# Patient Record
Sex: MALE | Race: WHITE | HISPANIC OR LATINO | URBAN - METROPOLITAN AREA
[De-identification: names, ages, dates, MRNs, and addresses within clinical notes are randomized per-mention and may not be internally consistent; named-entity substitution may affect disease eponyms.]

---

## 2021-07-15 ENCOUNTER — OFFICE VISIT (OUTPATIENT)
Dept: URBAN - METROPOLITAN AREA CLINIC 27 | Facility: CLINIC | Age: 56
End: 2021-07-15

## 2021-07-15 PROBLEM — 313436004 TYPE II DIABETES MELLITUS WITHOUT COMPLICATION: Status: ACTIVE | Noted: 2021-07-15

## 2021-07-15 PROBLEM — 266435005 GASTRO-ESOPHAGEAL REFLUX DISEASE WITHOUT ESOPHAGITIS: Status: ACTIVE | Noted: 2021-07-15

## 2021-07-15 PROBLEM — 275978004 SCREENING FOR MALIGNANT NEOPLASM OF COLON: Status: ACTIVE | Noted: 2021-07-15

## 2021-07-15 PROBLEM — 59621000 ESSENTIAL HYPERTENSION: Status: ACTIVE | Noted: 2021-07-15

## 2021-07-30 ENCOUNTER — OFFICE VISIT (OUTPATIENT)
Dept: URBAN - METROPOLITAN AREA SURGERY CENTER 7 | Facility: SURGERY CENTER | Age: 56
End: 2021-07-30

## 2022-04-30 ENCOUNTER — TELEPHONE ENCOUNTER (OUTPATIENT)
Dept: URBAN - METROPOLITAN AREA CLINIC 121 | Facility: CLINIC | Age: 57
End: 2022-04-30

## 2022-05-01 ENCOUNTER — TELEPHONE ENCOUNTER (OUTPATIENT)
Dept: URBAN - METROPOLITAN AREA CLINIC 121 | Facility: CLINIC | Age: 57
End: 2022-05-01

## 2022-05-01 RX ORDER — METFORMIN HCL 500 MG/1
TABLET ORAL
Status: ACTIVE | COMMUNITY
Start: 2021-07-15

## 2022-05-01 RX ORDER — PIOGLITAZONE 15 MG/1
TABLET ORAL
Status: ACTIVE | COMMUNITY
Start: 2021-07-15

## 2022-05-01 RX ORDER — CINNAMON BARK 500 MG
CAPSULE ORAL
Status: ACTIVE | COMMUNITY
Start: 2021-07-15

## 2022-05-01 RX ORDER — SIMVASTATIN 20 MG/1
TABLET, FILM COATED ORAL
Status: ACTIVE | COMMUNITY
Start: 2021-07-15

## 2022-05-01 RX ORDER — GLUCOSAMINE/MSM/CHONDROIT SULF 500-166.6
TABLET ORAL
Status: ACTIVE | COMMUNITY
Start: 2021-07-15

## 2022-05-01 RX ORDER — PRAZOSIN HYDROCHLORIDE 1 MG/1
CAPSULE ORAL
Status: ACTIVE | COMMUNITY
Start: 2021-07-15

## 2022-05-01 RX ORDER — OMEPRAZOLE 40 MG/1
PO QD CAPSULE, DELAYED RELEASE ORAL
Status: ACTIVE | COMMUNITY
Start: 2021-08-02

## 2022-05-01 RX ORDER — DULAGLUTIDE 0.75 MG/.5ML
INJECTION, SOLUTION SUBCUTANEOUS
Status: ACTIVE | COMMUNITY
Start: 2021-07-15

## 2022-05-01 RX ORDER — UBIDECARENONE/VIT E ACET 100MG-5
CAPSULE ORAL
Status: ACTIVE | COMMUNITY
Start: 2021-07-15

## 2022-05-01 RX ORDER — SERTRALINE 50 MG/1
TABLET, FILM COATED ORAL
Status: ACTIVE | COMMUNITY
Start: 2021-07-15

## 2022-05-01 RX ORDER — AMLODIPINE BESYLATE 10 MG/1
TABLET ORAL
Status: ACTIVE | COMMUNITY
Start: 2021-07-15

## 2022-05-01 RX ORDER — ASPIRIN 81 MG/1
TABLET, COATED ORAL
Status: ACTIVE | COMMUNITY
Start: 2021-07-15

## 2022-07-12 ENCOUNTER — ERX REFILL RESPONSE (OUTPATIENT)
Dept: URBAN - METROPOLITAN AREA CLINIC 27 | Facility: CLINIC | Age: 57
End: 2022-07-12

## 2022-07-12 RX ORDER — OMEPRAZOLE 40 MG/1
PO QD CAPSULE, DELAYED RELEASE ORAL
OUTPATIENT

## 2022-07-12 RX ORDER — OMEPRAZOLE 40 MG/1
TAKE 1 CAPSULE BY MOUTH EVERY DAY CAPSULE, DELAYED RELEASE ORAL
Qty: 90 CAPSULE | Refills: 3 | OUTPATIENT

## 2022-09-02 ENCOUNTER — LAB OUTSIDE AN ENCOUNTER (OUTPATIENT)
Dept: URBAN - METROPOLITAN AREA CLINIC 27 | Facility: CLINIC | Age: 57
End: 2022-09-02

## 2022-09-02 ENCOUNTER — OFFICE VISIT (OUTPATIENT)
Dept: URBAN - METROPOLITAN AREA CLINIC 27 | Facility: CLINIC | Age: 57
End: 2022-09-02
Payer: COMMERCIAL

## 2022-09-02 ENCOUNTER — WEB ENCOUNTER (OUTPATIENT)
Dept: URBAN - METROPOLITAN AREA CLINIC 27 | Facility: CLINIC | Age: 57
End: 2022-09-02

## 2022-09-02 ENCOUNTER — DASHBOARD ENCOUNTERS (OUTPATIENT)
Age: 57
End: 2022-09-02

## 2022-09-02 DIAGNOSIS — D50.8 OTHER IRON DEFICIENCY ANEMIA: ICD-10-CM

## 2022-09-02 PROCEDURE — 99214 OFFICE O/P EST MOD 30 MIN: CPT | Performed by: INTERNAL MEDICINE

## 2022-09-02 RX ORDER — PIOGLITAZONE 15 MG/1
TABLET ORAL
Status: ACTIVE | COMMUNITY
Start: 2021-07-15

## 2022-09-02 RX ORDER — SIMVASTATIN 20 MG/1
TABLET, FILM COATED ORAL
Status: ACTIVE | COMMUNITY
Start: 2021-07-15

## 2022-09-02 RX ORDER — CINNAMON BARK 500 MG
CAPSULE ORAL
Status: ACTIVE | COMMUNITY
Start: 2021-07-15

## 2022-09-02 RX ORDER — SERTRALINE 50 MG/1
TABLET, FILM COATED ORAL
Status: ACTIVE | COMMUNITY
Start: 2021-07-15

## 2022-09-02 RX ORDER — AMLODIPINE BESYLATE 10 MG/1
TABLET ORAL
Status: ACTIVE | COMMUNITY
Start: 2021-07-15

## 2022-09-02 RX ORDER — OMEPRAZOLE 40 MG/1
TAKE 1 CAPSULE BY MOUTH EVERY DAY CAPSULE, DELAYED RELEASE ORAL
Qty: 90 CAPSULE | Refills: 3 | Status: ACTIVE | COMMUNITY

## 2022-09-02 RX ORDER — GLUCOSAMINE/MSM/CHONDROIT SULF 500-166.6
TABLET ORAL
Status: ACTIVE | COMMUNITY
Start: 2021-07-15

## 2022-09-02 RX ORDER — ASPIRIN 81 MG/1
TABLET, COATED ORAL
Status: ACTIVE | COMMUNITY
Start: 2021-07-15

## 2022-09-02 RX ORDER — METFORMIN HCL 500 MG/1
TABLET ORAL
Status: ACTIVE | COMMUNITY
Start: 2021-07-15

## 2022-09-02 RX ORDER — PRAZOSIN HYDROCHLORIDE 1 MG/1
CAPSULE ORAL
Status: ACTIVE | COMMUNITY
Start: 2021-07-15

## 2022-09-02 RX ORDER — DULAGLUTIDE 0.75 MG/.5ML
INJECTION, SOLUTION SUBCUTANEOUS
Status: ACTIVE | COMMUNITY
Start: 2021-07-15

## 2022-09-02 RX ORDER — UBIDECARENONE/VIT E ACET 100MG-5
CAPSULE ORAL
Status: ACTIVE | COMMUNITY
Start: 2021-07-15

## 2022-09-02 NOTE — HPI-TODAY'S VISIT:
57-year-old male here for anemia.  He was noted to have a hemoglobin of 12.4 and his most recent labs, lower limit is 13.2.  No overt bleeding.  Denies melena, hematochezia, hematemesis.  He has had some heartburn, currently fairly well controlled with omeprazole.  No dysphagia or weight loss.  EGD last year showed a large polyp in the transverse colon that was removed that was a tubular adenoma.  Never had an EGD.

## 2022-09-06 PROBLEM — 87522002: Status: ACTIVE | Noted: 2022-09-02

## 2022-09-30 ENCOUNTER — OFFICE VISIT (OUTPATIENT)
Dept: URBAN - METROPOLITAN AREA SURGERY CENTER 7 | Facility: SURGERY CENTER | Age: 57
End: 2022-09-30
Payer: COMMERCIAL

## 2022-09-30 ENCOUNTER — TELEPHONE ENCOUNTER (OUTPATIENT)
Dept: URBAN - METROPOLITAN AREA CLINIC 27 | Facility: CLINIC | Age: 57
End: 2022-09-30

## 2022-09-30 DIAGNOSIS — D50.8 ACQUIRED IRON DEFICIENCY ANEMIA DUE TO DECREASED ABSORPTION: ICD-10-CM

## 2022-09-30 PROCEDURE — 43235 EGD DIAGNOSTIC BRUSH WASH: CPT | Performed by: INTERNAL MEDICINE

## 2022-09-30 PROCEDURE — G8907 PT DOC NO EVENTS ON DISCHARG: HCPCS | Performed by: INTERNAL MEDICINE

## 2022-09-30 PROCEDURE — G8075 ESRD PT W/ DIALY OF URR>=65%: HCPCS | Performed by: INTERNAL MEDICINE

## 2022-09-30 RX ORDER — CINNAMON BARK 500 MG
CAPSULE ORAL
Status: ACTIVE | COMMUNITY
Start: 2021-07-15

## 2022-09-30 RX ORDER — GLUCOSAMINE/MSM/CHONDROIT SULF 500-166.6
TABLET ORAL
Status: ACTIVE | COMMUNITY
Start: 2021-07-15

## 2022-09-30 RX ORDER — SIMVASTATIN 20 MG/1
TABLET, FILM COATED ORAL
Status: ACTIVE | COMMUNITY
Start: 2021-07-15

## 2022-09-30 RX ORDER — PIOGLITAZONE 15 MG/1
TABLET ORAL
Status: ACTIVE | COMMUNITY
Start: 2021-07-15

## 2022-09-30 RX ORDER — AMLODIPINE BESYLATE 10 MG/1
TABLET ORAL
Status: ACTIVE | COMMUNITY
Start: 2021-07-15

## 2022-09-30 RX ORDER — SERTRALINE 50 MG/1
TABLET, FILM COATED ORAL
Status: ACTIVE | COMMUNITY
Start: 2021-07-15

## 2022-09-30 RX ORDER — ASPIRIN 81 MG/1
TABLET, COATED ORAL
Status: ACTIVE | COMMUNITY
Start: 2021-07-15

## 2022-09-30 RX ORDER — DULAGLUTIDE 0.75 MG/.5ML
INJECTION, SOLUTION SUBCUTANEOUS
Status: ACTIVE | COMMUNITY
Start: 2021-07-15

## 2022-09-30 RX ORDER — UBIDECARENONE/VIT E ACET 100MG-5
CAPSULE ORAL
Status: ACTIVE | COMMUNITY
Start: 2021-07-15

## 2022-09-30 RX ORDER — METFORMIN HCL 500 MG/1
TABLET ORAL
Status: ACTIVE | COMMUNITY
Start: 2021-07-15

## 2022-09-30 RX ORDER — OMEPRAZOLE 40 MG/1
TAKE 1 CAPSULE BY MOUTH EVERY DAY CAPSULE, DELAYED RELEASE ORAL
Qty: 90 CAPSULE | Refills: 3 | Status: ACTIVE | COMMUNITY

## 2022-09-30 RX ORDER — PRAZOSIN HYDROCHLORIDE 1 MG/1
CAPSULE ORAL
Status: ACTIVE | COMMUNITY
Start: 2021-07-15

## 2023-07-25 ENCOUNTER — ERX REFILL RESPONSE (OUTPATIENT)
Dept: URBAN - METROPOLITAN AREA CLINIC 27 | Facility: CLINIC | Age: 58
End: 2023-07-25

## 2023-07-25 RX ORDER — OMEPRAZOLE 40 MG/1
TAKE 1 CAPSULE BY MOUTH ONCE DAILY CAPSULE, DELAYED RELEASE ORAL
Qty: 30 CAPSULE | Refills: 0 | OUTPATIENT

## 2023-07-25 RX ORDER — OMEPRAZOLE 40 MG/1
TAKE 1 CAPSULE BY MOUTH EVERY DAY CAPSULE, DELAYED RELEASE ORAL
Qty: 90 CAPSULE | Refills: 3 | OUTPATIENT

## 2023-08-29 ENCOUNTER — ERX REFILL RESPONSE (OUTPATIENT)
Dept: URBAN - METROPOLITAN AREA CLINIC 27 | Facility: CLINIC | Age: 58
End: 2023-08-29

## 2023-08-29 RX ORDER — OMEPRAZOLE 40 MG/1
TAKE 1 CAPSULE BY MOUTH ONCE DAILY CAPSULE, DELAYED RELEASE ORAL
Qty: 30 CAPSULE | Refills: 5 | OUTPATIENT

## 2023-08-29 RX ORDER — OMEPRAZOLE 40 MG/1
TAKE 1 CAPSULE BY MOUTH ONCE DAILY CAPSULE, DELAYED RELEASE ORAL
Qty: 30 CAPSULE | Refills: 0 | OUTPATIENT

## 2024-08-28 ENCOUNTER — ERX REFILL RESPONSE (OUTPATIENT)
Dept: URBAN - METROPOLITAN AREA CLINIC 27 | Facility: CLINIC | Age: 59
End: 2024-08-28

## 2024-08-28 RX ORDER — OMEPRAZOLE 40 MG/1
TAKE 1 CAPSULE BY MOUTH ONCE DAILY CAPSULE, DELAYED RELEASE ORAL
Qty: 30 CAPSULE | Refills: 5 | OUTPATIENT

## 2025-02-24 ENCOUNTER — ERX REFILL RESPONSE (OUTPATIENT)
Dept: URBAN - METROPOLITAN AREA CLINIC 27 | Facility: CLINIC | Age: 60
End: 2025-02-24

## 2025-02-24 RX ORDER — OMEPRAZOLE 40 MG/1
TAKE 1 CAPSULE BY MOUTH ONCE DAILY CAPSULE, DELAYED RELEASE ORAL
Qty: 30 CAPSULE | Refills: 5 | OUTPATIENT

## 2025-04-02 ENCOUNTER — LAB OUTSIDE AN ENCOUNTER (OUTPATIENT)
Dept: URBAN - METROPOLITAN AREA CLINIC 27 | Facility: CLINIC | Age: 60
End: 2025-04-02

## 2025-04-02 ENCOUNTER — OFFICE VISIT (OUTPATIENT)
Dept: URBAN - METROPOLITAN AREA CLINIC 27 | Facility: CLINIC | Age: 60
End: 2025-04-02
Payer: COMMERCIAL

## 2025-04-02 DIAGNOSIS — Z86.0100 PERSONAL HISTORY OF COLONIC POLYPS: ICD-10-CM

## 2025-04-02 DIAGNOSIS — K21.9 CHRONIC GERD: ICD-10-CM

## 2025-04-02 DIAGNOSIS — D50.8 OTHER IRON DEFICIENCY ANEMIA: ICD-10-CM

## 2025-04-02 PROBLEM — 235595009: Status: ACTIVE | Noted: 2025-04-02

## 2025-04-02 PROCEDURE — 99214 OFFICE O/P EST MOD 30 MIN: CPT | Performed by: INTERNAL MEDICINE

## 2025-04-02 RX ORDER — GLUCOSAMINE/MSM/CHONDROIT SULF 500-166.6
TABLET ORAL
Status: ACTIVE | COMMUNITY
Start: 2021-07-15

## 2025-04-02 RX ORDER — PIOGLITAZONE 15 MG/1
TABLET ORAL
Status: ACTIVE | COMMUNITY
Start: 2021-07-15

## 2025-04-02 RX ORDER — METFORMIN HCL 500 MG/1
TABLET ORAL
Status: ACTIVE | COMMUNITY
Start: 2021-07-15

## 2025-04-02 RX ORDER — SIMVASTATIN 20 MG/1
TABLET, FILM COATED ORAL
Status: ACTIVE | COMMUNITY
Start: 2021-07-15

## 2025-04-02 RX ORDER — OMEPRAZOLE 40 MG/1
TAKE 1 CAPSULE BY MOUTH ONCE DAILY CAPSULE, DELAYED RELEASE ORAL
Qty: 30 CAPSULE | Refills: 5 | Status: ACTIVE | COMMUNITY

## 2025-04-02 RX ORDER — UBIDECARENONE/VIT E ACET 100MG-5
CAPSULE ORAL
Status: ACTIVE | COMMUNITY
Start: 2021-07-15

## 2025-04-02 RX ORDER — SERTRALINE 50 MG/1
TABLET, FILM COATED ORAL
Status: ACTIVE | COMMUNITY
Start: 2021-07-15

## 2025-04-02 RX ORDER — AMLODIPINE BESYLATE 10 MG/1
TABLET ORAL
Status: ACTIVE | COMMUNITY
Start: 2021-07-15

## 2025-04-02 RX ORDER — DULAGLUTIDE 0.75 MG/.5ML
INJECTION, SOLUTION SUBCUTANEOUS
Status: ACTIVE | COMMUNITY
Start: 2021-07-15

## 2025-04-02 RX ORDER — ASPIRIN 81 MG/1
TABLET, COATED ORAL
Status: ACTIVE | COMMUNITY
Start: 2021-07-15

## 2025-04-02 RX ORDER — CINNAMON BARK 500 MG
CAPSULE ORAL
Status: ACTIVE | COMMUNITY
Start: 2021-07-15

## 2025-04-02 RX ORDER — PRAZOSIN HYDROCHLORIDE 1 MG/1
CAPSULE ORAL
Status: ACTIVE | COMMUNITY
Start: 2021-07-15

## 2025-04-02 NOTE — HPI-TODAY'S VISIT:
59-year-old male here for history of polyps, anemia.  He was noted to have iron deficiency anemia by his primary care physician on most recent labs.  Denies any overt bleeding including no rectal bleeding, melena, hematemesis.  He does have frequent reflux symptoms, predominantly heartburn.  Last colonoscopy was 2021 at which time he had a large polyp removed with recommendations to repeat colonoscopy in 3 years.
Diet: regular  DVT proph: xarelto  Dispo: Likely home

## 2025-05-02 ENCOUNTER — OFFICE VISIT (OUTPATIENT)
Dept: URBAN - METROPOLITAN AREA SURGERY CENTER 7 | Facility: SURGERY CENTER | Age: 60
End: 2025-05-02
Payer: COMMERCIAL

## 2025-05-02 ENCOUNTER — CLAIMS CREATED FROM THE CLAIM WINDOW (OUTPATIENT)
Dept: URBAN - METROPOLITAN AREA CLINIC 4 | Facility: CLINIC | Age: 60
End: 2025-05-02
Payer: COMMERCIAL

## 2025-05-02 DIAGNOSIS — K29.40 METAPLASTIC GASTRITIS: ICD-10-CM

## 2025-05-02 DIAGNOSIS — K31.89 OTHER DISEASES OF STOMACH AND DUODENUM: ICD-10-CM

## 2025-05-02 DIAGNOSIS — K31.A11 INTESTINAL METAPLASIA OF ANTRUM OF STOMACH WITHOUT DYSPLASIA: ICD-10-CM

## 2025-05-02 DIAGNOSIS — K31.A12 INTESTINAL METAPLASIA OF BODY OF STOMACH WITHOUT DYSPLASIA: ICD-10-CM

## 2025-05-02 DIAGNOSIS — D12.3 BENIGN NEOPLASM OF TRANSVERSE COLON: ICD-10-CM

## 2025-05-02 DIAGNOSIS — D12.5 ADENOMATOUS POLYP OF SIGMOID COLON: ICD-10-CM

## 2025-05-02 DIAGNOSIS — D12.3 ADENOMA OF TRANSVERSE COLON: ICD-10-CM

## 2025-05-02 DIAGNOSIS — Z86.0101 PERSONAL HISTORY OF ADENOMATOUS AND SERRATED COLON POLYPS: ICD-10-CM

## 2025-05-02 DIAGNOSIS — D12.4 ADENOMATOUS POLYP OF DESCENDING COLON: ICD-10-CM

## 2025-05-02 DIAGNOSIS — D12.4 BENIGN NEOPLASM OF DESCENDING COLON: ICD-10-CM

## 2025-05-02 DIAGNOSIS — R12 HEARTBURN: ICD-10-CM

## 2025-05-02 DIAGNOSIS — K29.40 CHRONIC ATROPHIC GASTRITIS WITHOUT BLEEDING: ICD-10-CM

## 2025-05-02 DIAGNOSIS — Z86.0101 H/O ADENOMATOUS POLYP OF COLON: ICD-10-CM

## 2025-05-02 DIAGNOSIS — D12.5 ADENOMA OF SIGMOID COLON: ICD-10-CM

## 2025-05-02 DIAGNOSIS — D12.4 ADENOMA OF DESCENDING COLON: ICD-10-CM

## 2025-05-02 DIAGNOSIS — Z12.11 COLON CANCER SCREENING (HIGH RISK): ICD-10-CM

## 2025-05-02 DIAGNOSIS — Z86.0100 PERSONAL HISTORY OF COLONIC POLYPS: ICD-10-CM

## 2025-05-02 PROCEDURE — 88342 IMHCHEM/IMCYTCHM 1ST ANTB: CPT | Performed by: PATHOLOGY

## 2025-05-02 PROCEDURE — 88341 IMHCHEM/IMCYTCHM EA ADD ANTB: CPT | Performed by: PATHOLOGY

## 2025-05-02 PROCEDURE — 43239 EGD BIOPSY SINGLE/MULTIPLE: CPT | Performed by: INTERNAL MEDICINE

## 2025-05-02 PROCEDURE — 0529F INTRVL 3/>YR PTS CLNSCP DOCD: CPT | Performed by: INTERNAL MEDICINE

## 2025-05-02 PROCEDURE — 88305 TISSUE EXAM BY PATHOLOGIST: CPT | Performed by: PATHOLOGY

## 2025-05-02 PROCEDURE — 45385 COLONOSCOPY W/LESION REMOVAL: CPT | Performed by: INTERNAL MEDICINE

## 2025-05-02 PROCEDURE — 00813 ANES UPR LWR GI NDSC PX: CPT | Performed by: ANESTHESIOLOGY

## 2025-05-02 RX ORDER — ASPIRIN 81 MG/1
TABLET, COATED ORAL
Status: ACTIVE | COMMUNITY
Start: 2021-07-15

## 2025-05-02 RX ORDER — SIMVASTATIN 20 MG/1
TABLET, FILM COATED ORAL
Status: ACTIVE | COMMUNITY
Start: 2021-07-15

## 2025-05-02 RX ORDER — METFORMIN HCL 500 MG/1
TABLET ORAL
Status: ACTIVE | COMMUNITY
Start: 2021-07-15

## 2025-05-02 RX ORDER — UBIDECARENONE/VIT E ACET 100MG-5
CAPSULE ORAL
Status: ACTIVE | COMMUNITY
Start: 2021-07-15

## 2025-05-02 RX ORDER — PIOGLITAZONE 15 MG/1
TABLET ORAL
Status: ACTIVE | COMMUNITY
Start: 2021-07-15

## 2025-05-02 RX ORDER — OMEPRAZOLE 40 MG/1
TAKE 1 CAPSULE BY MOUTH ONCE DAILY CAPSULE, DELAYED RELEASE ORAL
Qty: 30 CAPSULE | Refills: 5 | Status: ACTIVE | COMMUNITY

## 2025-05-02 RX ORDER — AMLODIPINE BESYLATE 10 MG/1
TABLET ORAL
Status: ACTIVE | COMMUNITY
Start: 2021-07-15

## 2025-05-02 RX ORDER — CINNAMON BARK 500 MG
CAPSULE ORAL
Status: ACTIVE | COMMUNITY
Start: 2021-07-15

## 2025-05-02 RX ORDER — PRAZOSIN HYDROCHLORIDE 1 MG/1
CAPSULE ORAL
Status: ACTIVE | COMMUNITY
Start: 2021-07-15

## 2025-05-02 RX ORDER — SERTRALINE 50 MG/1
TABLET, FILM COATED ORAL
Status: ACTIVE | COMMUNITY
Start: 2021-07-15

## 2025-05-02 RX ORDER — GLUCOSAMINE/MSM/CHONDROIT SULF 500-166.6
TABLET ORAL
Status: ACTIVE | COMMUNITY
Start: 2021-07-15

## 2025-05-02 RX ORDER — DULAGLUTIDE 0.75 MG/.5ML
INJECTION, SOLUTION SUBCUTANEOUS
Status: ACTIVE | COMMUNITY
Start: 2021-07-15

## 2025-06-04 ENCOUNTER — TELEPHONE ENCOUNTER (OUTPATIENT)
Dept: URBAN - METROPOLITAN AREA CLINIC 27 | Facility: CLINIC | Age: 60
End: 2025-06-04

## 2025-06-05 ENCOUNTER — LAB OUTSIDE AN ENCOUNTER (OUTPATIENT)
Dept: URBAN - METROPOLITAN AREA CLINIC 27 | Facility: CLINIC | Age: 60
End: 2025-06-05

## 2025-06-05 PROBLEM — 724556004: Status: ACTIVE | Noted: 2025-06-05

## 2025-06-11 ENCOUNTER — TELEPHONE ENCOUNTER (OUTPATIENT)
Dept: URBAN - METROPOLITAN AREA CLINIC 27 | Facility: CLINIC | Age: 60
End: 2025-06-11

## 2025-07-01 ENCOUNTER — TELEPHONE ENCOUNTER (OUTPATIENT)
Dept: URBAN - METROPOLITAN AREA CLINIC 27 | Facility: CLINIC | Age: 60
End: 2025-07-01

## 2025-07-03 ENCOUNTER — OFFICE VISIT (OUTPATIENT)
Dept: URBAN - METROPOLITAN AREA CLINIC 26 | Facility: CLINIC | Age: 60
End: 2025-07-03
Payer: COMMERCIAL

## 2025-07-03 DIAGNOSIS — D50.9 ANEMIA: ICD-10-CM

## 2025-07-03 PROCEDURE — 91110 GI TRC IMG INTRAL ESOPH-ILE: CPT | Performed by: INTERNAL MEDICINE

## 2025-07-03 RX ORDER — OMEPRAZOLE 40 MG/1
TAKE 1 CAPSULE BY MOUTH ONCE DAILY CAPSULE, DELAYED RELEASE ORAL
Qty: 30 CAPSULE | Refills: 5 | Status: ACTIVE | COMMUNITY

## 2025-07-03 RX ORDER — PRAZOSIN HYDROCHLORIDE 1 MG/1
CAPSULE ORAL
Status: ACTIVE | COMMUNITY
Start: 2021-07-15

## 2025-07-03 RX ORDER — DULAGLUTIDE 0.75 MG/.5ML
INJECTION, SOLUTION SUBCUTANEOUS
Status: ACTIVE | COMMUNITY
Start: 2021-07-15

## 2025-07-03 RX ORDER — SERTRALINE 50 MG/1
TABLET, FILM COATED ORAL
Status: ACTIVE | COMMUNITY
Start: 2021-07-15

## 2025-07-03 RX ORDER — SIMVASTATIN 20 MG/1
TABLET, FILM COATED ORAL
Status: ACTIVE | COMMUNITY
Start: 2021-07-15

## 2025-07-03 RX ORDER — CINNAMON BARK 500 MG
CAPSULE ORAL
Status: ACTIVE | COMMUNITY
Start: 2021-07-15

## 2025-07-03 RX ORDER — PIOGLITAZONE 15 MG/1
TABLET ORAL
Status: ACTIVE | COMMUNITY
Start: 2021-07-15

## 2025-07-03 RX ORDER — AMLODIPINE BESYLATE 10 MG/1
TABLET ORAL
Status: ACTIVE | COMMUNITY
Start: 2021-07-15

## 2025-07-03 RX ORDER — ASPIRIN 81 MG/1
TABLET, COATED ORAL
Status: ACTIVE | COMMUNITY
Start: 2021-07-15

## 2025-07-03 RX ORDER — METFORMIN HCL 500 MG/1
TABLET ORAL
Status: ACTIVE | COMMUNITY
Start: 2021-07-15

## 2025-07-03 RX ORDER — GLUCOSAMINE/MSM/CHONDROIT SULF 500-166.6
TABLET ORAL
Status: ACTIVE | COMMUNITY
Start: 2021-07-15

## 2025-07-03 RX ORDER — UBIDECARENONE/VIT E ACET 100MG-5
CAPSULE ORAL
Status: ACTIVE | COMMUNITY
Start: 2021-07-15

## 2025-07-10 ENCOUNTER — TELEPHONE ENCOUNTER (OUTPATIENT)
Dept: URBAN - METROPOLITAN AREA CLINIC 27 | Facility: CLINIC | Age: 60
End: 2025-07-10

## 2025-07-23 ENCOUNTER — TELEPHONE ENCOUNTER (OUTPATIENT)
Dept: URBAN - METROPOLITAN AREA CLINIC 27 | Facility: CLINIC | Age: 60
End: 2025-07-23